# Patient Record
Sex: FEMALE | Race: WHITE | Employment: FULL TIME | URBAN - METROPOLITAN AREA
[De-identification: names, ages, dates, MRNs, and addresses within clinical notes are randomized per-mention and may not be internally consistent; named-entity substitution may affect disease eponyms.]

---

## 2017-12-16 ENCOUNTER — HOSPITAL ENCOUNTER (OUTPATIENT)
Dept: MAMMOGRAPHY | Age: 52
Discharge: HOME | End: 2017-12-16
Attending: INTERNAL MEDICINE
Payer: COMMERCIAL

## 2017-12-16 DIAGNOSIS — Z12.31 VISIT FOR SCREENING MAMMOGRAM: ICD-10-CM

## 2017-12-16 PROCEDURE — 77067 SCR MAMMO BI INCL CAD: CPT

## 2017-12-26 ENCOUNTER — HOSPITAL ENCOUNTER (OUTPATIENT)
Dept: MAMMOGRAPHY | Age: 52
Discharge: HOME | End: 2017-12-26
Attending: INTERNAL MEDICINE
Payer: COMMERCIAL

## 2017-12-26 DIAGNOSIS — R92.8 ABNORMAL SCREENING MAMMOGRAM: ICD-10-CM

## 2017-12-26 PROCEDURE — 77065 DX MAMMO INCL CAD UNI: CPT

## 2018-02-28 ENCOUNTER — APPOINTMENT (RX ONLY)
Dept: URBAN - METROPOLITAN AREA CLINIC 24 | Facility: CLINIC | Age: 53
Setting detail: DERMATOLOGY
End: 2018-02-28

## 2018-02-28 DIAGNOSIS — Z41.9 ENCOUNTER FOR PROCEDURE FOR PURPOSES OTHER THAN REMEDYING HEALTH STATE, UNSPECIFIED: ICD-10-CM

## 2018-02-28 PROCEDURE — ? GENTLELASE

## 2018-02-28 ASSESSMENT — LOCATION ZONE DERM
LOCATION ZONE: LEG
LOCATION ZONE: TRUNK

## 2018-02-28 ASSESSMENT — LOCATION SIMPLE DESCRIPTION DERM
LOCATION SIMPLE: GENITALIA
LOCATION SIMPLE: LEFT THIGH

## 2018-02-28 ASSESSMENT — LOCATION DETAILED DESCRIPTION DERM
LOCATION DETAILED: GENITALIA
LOCATION DETAILED: LEFT ANTERIOR PROXIMAL THIGH

## 2018-02-28 NOTE — PROCEDURE: GENTLELASE
Price (Use Numbers Only, No Special Characters Or $): 150.00
External Cooling: [place] Cryo 6
Cooling: DCD 50/50
Laser Type: Alexandrite 755nm
Total Pulses: 43
Fluence: 14
Indication: Laser Hair Removal
Detail Level: Detailed
Post-Care Instructions: I reviewed with the patient in detail post-care instructions. Patient should avoid sun for a minimum of 4 weeks before and after treatment.
Pulse Duration: 3 ms
Endpoint: Seborrheic Keratoses: Immediate endpoint: perilesional erythema and edema. Vaseline and ice applied. Post care reviewed with patient.
External Cooling Fan Speed: 1
Consent: Written consent obtained, risks reviewed including but not limited to crusting, scabbing, blistering, scarring, darker or lighter pigmentary change, paradoxical hair regrowth, incomplete removal of hair and infection.
Endpoint: Laser Hair Removal: Immediate endpoint: perifollicular erythema and edema. Vaseline and ice applied. Post care reviewed with patient.
Spot Size: 18 mm

## 2018-04-06 ENCOUNTER — APPOINTMENT (RX ONLY)
Dept: URBAN - METROPOLITAN AREA CLINIC 24 | Facility: CLINIC | Age: 53
Setting detail: DERMATOLOGY
End: 2018-04-06

## 2018-04-06 DIAGNOSIS — Z41.9 ENCOUNTER FOR PROCEDURE FOR PURPOSES OTHER THAN REMEDYING HEALTH STATE, UNSPECIFIED: ICD-10-CM

## 2018-04-06 PROCEDURE — ? GENTLELASE

## 2018-04-06 ASSESSMENT — LOCATION DETAILED DESCRIPTION DERM
LOCATION DETAILED: RIGHT ANTERIOR PROXIMAL THIGH
LOCATION DETAILED: LEFT ANTERIOR PROXIMAL THIGH

## 2018-04-06 ASSESSMENT — LOCATION ZONE DERM: LOCATION ZONE: LEG

## 2018-04-06 ASSESSMENT — LOCATION SIMPLE DESCRIPTION DERM
LOCATION SIMPLE: RIGHT THIGH
LOCATION SIMPLE: LEFT THIGH

## 2018-04-06 NOTE — PROCEDURE: GENTLELASE
External Cooling: [place] Cryo 6
Price (Use Numbers Only, No Special Characters Or $): 600.00
Total Pulses: 174
Consent: Written consent obtained, risks reviewed including but not limited to crusting, scabbing, blistering, scarring, darker or lighter pigmentary change, paradoxical hair regrowth, incomplete removal of hair and infection.
Endpoint: Laser Hair Removal: Immediate endpoint: perifollicular erythema and edema. Vaseline and ice applied. Post care reviewed with patient.
Endpoint: Seborrheic Keratoses: Immediate endpoint: perilesional erythema and edema. Vaseline and ice applied. Post care reviewed with patient.
Pulse Duration: 3 ms
Laser Type: Alexandrite 755nm
Fluence: 14
Spot Size: 18 mm
Cooling: DCD 50/50
External Cooling Fan Speed: 1
Detail Level: Detailed
Indication: Laser Hair Removal
Post-Care Instructions: I reviewed with the patient in detail post-care instructions. Patient should avoid sun for a minimum of 4 weeks before and after treatment.

## 2018-05-02 ENCOUNTER — APPOINTMENT (RX ONLY)
Dept: URBAN - METROPOLITAN AREA CLINIC 24 | Facility: CLINIC | Age: 53
Setting detail: DERMATOLOGY
End: 2018-05-02

## 2018-05-02 DIAGNOSIS — Z41.9 ENCOUNTER FOR PROCEDURE FOR PURPOSES OTHER THAN REMEDYING HEALTH STATE, UNSPECIFIED: ICD-10-CM

## 2018-05-02 PROCEDURE — ? GENTLELASE

## 2018-05-02 ASSESSMENT — LOCATION ZONE DERM
LOCATION ZONE: TRUNK
LOCATION ZONE: LEG

## 2018-05-02 ASSESSMENT — LOCATION DETAILED DESCRIPTION DERM
LOCATION DETAILED: LEFT SUPRAPUBIC SKIN
LOCATION DETAILED: LEFT ANTERIOR PROXIMAL THIGH
LOCATION DETAILED: GENITALIA

## 2018-05-02 ASSESSMENT — LOCATION SIMPLE DESCRIPTION DERM
LOCATION SIMPLE: GROIN
LOCATION SIMPLE: GENITALIA
LOCATION SIMPLE: LEFT THIGH

## 2018-05-02 NOTE — PROCEDURE: GENTLELASE
Spot Size: 18 mm
Consent: Written consent obtained, risks reviewed including but not limited to crusting, scabbing, blistering, scarring, darker or lighter pigmentary change, paradoxical hair regrowth, incomplete removal of hair and infection.
Total Pulses: 87
Cooling: DCD 50/50
Endpoint: Seborrheic Keratoses: Immediate endpoint: perilesional erythema and edema. Vaseline and ice applied. Post care reviewed with patient.
Laser Type: Alexandrite 755nm
Detail Level: Detailed
Post-Care Instructions: I reviewed with the patient in detail post-care instructions. Patient should avoid sun for a minimum of 4 weeks before and after treatment.
Fluence: 12
Endpoint: Laser Hair Removal: Immediate endpoint: perifollicular erythema and edema. Vaseline and ice applied. Post care reviewed with patient.
External Cooling: [place] Cryo 6
Indication: Laser Hair Removal
Pulse Duration: 3 ms
External Cooling Fan Speed: 1

## 2018-06-28 ENCOUNTER — APPOINTMENT (RX ONLY)
Dept: URBAN - METROPOLITAN AREA CLINIC 24 | Facility: CLINIC | Age: 53
Setting detail: DERMATOLOGY
End: 2018-06-28

## 2018-06-28 DIAGNOSIS — Z41.9 ENCOUNTER FOR PROCEDURE FOR PURPOSES OTHER THAN REMEDYING HEALTH STATE, UNSPECIFIED: ICD-10-CM

## 2018-06-28 PROCEDURE — ? GENTLELASE

## 2018-06-28 ASSESSMENT — LOCATION SIMPLE DESCRIPTION DERM
LOCATION SIMPLE: RIGHT THIGH
LOCATION SIMPLE: LEFT THIGH

## 2018-06-28 ASSESSMENT — LOCATION DETAILED DESCRIPTION DERM
LOCATION DETAILED: RIGHT ANTERIOR PROXIMAL THIGH
LOCATION DETAILED: LEFT ANTERIOR PROXIMAL THIGH

## 2018-06-28 ASSESSMENT — LOCATION ZONE DERM: LOCATION ZONE: LEG

## 2018-06-28 NOTE — PROCEDURE: GENTLELASE
Endpoint: Lentigines: Immediate endpoint: perilesional erythema and edema. Vaseline and ice applied. Post care reviewed with patient.
Fluence: 12
Cooling: DCD 50/50
Consent: Written consent obtained, risks reviewed including but not limited to crusting, scabbing, blistering, scarring, darker or lighter pigmentary change, paradoxical hair regrowth, incomplete removal of hair and infection.
Pulse Duration: 3 ms
External Cooling: [place] Cryo 6
External Cooling Fan Speed: 1
Post-Care Instructions: I reviewed with the patient in detail post-care instructions. Patient should avoid sun for a minimum of 4 weeks before and after treatment.
Detail Level: Detailed
Spot Size: 18 mm
Total Pulses: 26
Indication: Laser Hair Removal
Endpoint: Laser Hair Removal: Immediate endpoint: perifollicular erythema and edema. Vaseline and ice applied. Post care reviewed with patient.
Laser Type: Alexandrite 755nm

## 2018-08-08 ENCOUNTER — APPOINTMENT (RX ONLY)
Dept: URBAN - METROPOLITAN AREA CLINIC 24 | Facility: CLINIC | Age: 53
Setting detail: DERMATOLOGY
End: 2018-08-08

## 2018-08-08 DIAGNOSIS — Z41.9 ENCOUNTER FOR PROCEDURE FOR PURPOSES OTHER THAN REMEDYING HEALTH STATE, UNSPECIFIED: ICD-10-CM

## 2018-08-08 PROCEDURE — ? GENTLELASE

## 2018-08-08 ASSESSMENT — LOCATION SIMPLE DESCRIPTION DERM
LOCATION SIMPLE: GROIN
LOCATION SIMPLE: LEFT THIGH
LOCATION SIMPLE: RIGHT THIGH

## 2018-08-08 ASSESSMENT — LOCATION ZONE DERM
LOCATION ZONE: LEG
LOCATION ZONE: TRUNK

## 2018-08-08 ASSESSMENT — LOCATION DETAILED DESCRIPTION DERM
LOCATION DETAILED: RIGHT ANTERIOR PROXIMAL THIGH
LOCATION DETAILED: LEFT SUPRAPUBIC SKIN
LOCATION DETAILED: LEFT ANTERIOR PROXIMAL THIGH

## 2018-08-08 NOTE — PROCEDURE: GENTLELASE
Endpoint: Lentigines: Immediate endpoint: perilesional erythema and edema. Vaseline and ice applied. Post care reviewed with patient.
Detail Level: Detailed
External Cooling Fan Speed: 1
Cooling: DCD 50/50
Endpoint: Laser Hair Removal: Immediate endpoint: perifollicular erythema and edema. Vaseline and ice applied. Post care reviewed with patient.
Fluence: 16
Indication: Laser Hair Removal
Post-Care Instructions: I reviewed with the patient in detail post-care instructions. Patient should avoid sun for a minimum of 4 weeks before and after treatment.
Spot Size: 18 mm
Pulse Duration: 3 ms
Total Pulses: 81
Laser Type: Alexandrite 755nm
External Cooling: [place] Cryo 6
Consent: Written consent obtained, risks reviewed including but not limited to crusting, scabbing, blistering, scarring, darker or lighter pigmentary change, paradoxical hair regrowth, incomplete removal of hair and infection.

## 2018-10-02 ENCOUNTER — HOSPITAL ENCOUNTER (OUTPATIENT)
Dept: MAMMOGRAPHY | Age: 53
Discharge: HOME | End: 2018-10-02
Attending: INTERNAL MEDICINE
Payer: COMMERCIAL

## 2018-10-02 DIAGNOSIS — Z12.31 VISIT FOR SCREENING MAMMOGRAM: ICD-10-CM

## 2018-10-02 PROCEDURE — 77067 SCR MAMMO BI INCL CAD: CPT

## 2020-09-15 ENCOUNTER — HOSPITAL ENCOUNTER (OUTPATIENT)
Dept: MAMMOGRAPHY | Age: 55
Discharge: HOME | End: 2020-09-15
Attending: INTERNAL MEDICINE
Payer: COMMERCIAL

## 2020-09-15 DIAGNOSIS — Z12.31 ENCOUNTER FOR SCREENING MAMMOGRAM FOR BREAST CANCER: ICD-10-CM

## 2020-09-15 PROCEDURE — 77063 BREAST TOMOSYNTHESIS BI: CPT

## 2022-11-23 ENCOUNTER — TRANSCRIBE ORDERS (OUTPATIENT)
Dept: SCHEDULING | Age: 57
End: 2022-11-23

## 2022-11-23 DIAGNOSIS — Z12.31 SCREENING MAMMOGRAM FOR HIGH-RISK PATIENT: Primary | ICD-10-CM

## 2023-01-03 ENCOUNTER — HOSPITAL ENCOUNTER (OUTPATIENT)
Dept: MAMMOGRAPHY | Age: 58
Discharge: HOME | End: 2023-01-06
Payer: COMMERCIAL

## 2023-01-03 DIAGNOSIS — Z12.31 SCREENING MAMMOGRAM FOR HIGH-RISK PATIENT: ICD-10-CM

## 2023-01-03 PROCEDURE — 77063 BREAST TOMOSYNTHESIS BI: CPT

## 2023-10-30 DIAGNOSIS — L29.9 PRURITUS, UNSPECIFIED: Primary | ICD-10-CM

## 2023-10-30 RX ORDER — FLUOCINOLONE ACETONIDE 0.11 MG/ML
5 OIL AURICULAR (OTIC) DAILY
Qty: 1 EACH | Refills: 1 | Status: SHIPPED | OUTPATIENT
Start: 2023-10-30 | End: 2023-10-31 | Stop reason: SDUPTHER

## 2023-10-31 DIAGNOSIS — L29.9 PRURITUS, UNSPECIFIED: ICD-10-CM

## 2023-10-31 RX ORDER — FLUOCINOLONE ACETONIDE 0.11 MG/ML
5 OIL AURICULAR (OTIC) DAILY
Qty: 1 EACH | Refills: 1 | Status: SHIPPED | OUTPATIENT
Start: 2023-10-31

## 2024-01-17 ENCOUNTER — HOSPITAL ENCOUNTER (OUTPATIENT)
Dept: MAMMOGRAPHY | Age: 59
Discharge: HOME | End: 2024-01-20

## 2024-01-17 DIAGNOSIS — Z12.39 SCREENING BREAST EXAMINATION: ICD-10-CM

## 2024-01-22 ENCOUNTER — HOSPITAL ENCOUNTER (OUTPATIENT)
Dept: MAMMOGRAPHY | Age: 59
Discharge: HOME | End: 2024-01-25
Payer: COMMERCIAL

## 2024-01-22 VITALS — WEIGHT: 135 LBS | BODY MASS INDEX: 23.05 KG/M2 | HEIGHT: 64 IN

## 2024-01-22 PROCEDURE — 77063 BREAST TOMOSYNTHESIS BI: CPT

## 2024-03-14 ENCOUNTER — OFFICE VISIT (OUTPATIENT)
Dept: ENT CLINIC | Age: 59
End: 2024-03-14
Payer: COMMERCIAL

## 2024-03-14 VITALS
DIASTOLIC BLOOD PRESSURE: 68 MMHG | WEIGHT: 135.6 LBS | SYSTOLIC BLOOD PRESSURE: 118 MMHG | HEIGHT: 64 IN | BODY MASS INDEX: 23.15 KG/M2

## 2024-03-14 DIAGNOSIS — H61.23 EXCESSIVE CERUMEN IN EAR CANAL, BILATERAL: Primary | ICD-10-CM

## 2024-03-14 DIAGNOSIS — M26.609 TMJ (TEMPOROMANDIBULAR JOINT DISORDER): ICD-10-CM

## 2024-03-14 PROCEDURE — 69210 REMOVE IMPACTED EAR WAX UNI: CPT | Performed by: STUDENT IN AN ORGANIZED HEALTH CARE EDUCATION/TRAINING PROGRAM

## 2024-03-14 ASSESSMENT — ENCOUNTER SYMPTOMS
EYE ITCHING: 0
VOMITING: 0
EYE REDNESS: 0
SHORTNESS OF BREATH: 0

## 2024-03-14 NOTE — PROGRESS NOTES
removal under binocular microscopy  INDICATIONS: Cerumen impaction  DESCRIPTION: After verbal consent was obtained and a timeout was performed, the otologic microscope was used to visualize both ears. There were normal appearing auricles bilaterally. There was cerumen impaction bilaterally, right greater than left. I cleaned out both ears under the scope w/ a right angle hook and otologic suctions. After cleaning, the ear canal skin was healthy and both TMs were intact w/ no perforations. Both middle ear spaces were clear w/ no effusions. The patient tolerated the procedure well and there were no complications.    ASSESSMENT and PLAN  I will see her back in 1 year for repeat cerumen debridement.    ICD-10-CM    1. Excessive cerumen in ear canal, bilateral  H61.23 REMOVE IMPACTED EAR WAX      2. TMJ (temporomandibular joint disorder)  M26.609               Leighton Tabares MD  3/14/2024  Electronically signed    Note dictated using voice recognition software.  Please excuse any typos.

## 2024-09-14 DIAGNOSIS — L29.9 PRURITUS, UNSPECIFIED: ICD-10-CM

## 2024-09-16 RX ORDER — FLUOCINOLONE ACETONIDE 0.11 MG/ML
OIL AURICULAR (OTIC)
Qty: 20 ML | Refills: 1 | Status: SHIPPED | OUTPATIENT
Start: 2024-09-16

## 2025-03-25 ENCOUNTER — TRANSCRIBE ORDERS (OUTPATIENT)
Dept: SCHEDULING | Age: 60
End: 2025-03-25

## 2025-03-25 DIAGNOSIS — Z12.31 VISIT FOR SCREENING MAMMOGRAM: Primary | ICD-10-CM

## 2025-04-02 ENCOUNTER — OFFICE VISIT (OUTPATIENT)
Dept: ENT CLINIC | Age: 60
End: 2025-04-02

## 2025-04-02 VITALS — WEIGHT: 135 LBS | HEIGHT: 64 IN | BODY MASS INDEX: 23.05 KG/M2 | RESPIRATION RATE: 16 BRPM

## 2025-04-02 DIAGNOSIS — H93.8X3 EAR PRESSURE, BILATERAL: Primary | ICD-10-CM

## 2025-04-02 DIAGNOSIS — H61.21 EXCESSIVE EAR WAX, RIGHT: ICD-10-CM

## 2025-04-02 RX ORDER — FLUOCINOLONE ACETONIDE 0.11 MG/ML
OIL AURICULAR (OTIC)
Qty: 20 ML | Refills: 5 | Status: SHIPPED | OUTPATIENT
Start: 2025-04-02 | End: 2025-04-02 | Stop reason: SDUPTHER

## 2025-04-02 RX ORDER — MUPIROCIN 20 MG/G
OINTMENT TOPICAL
COMMUNITY
Start: 2025-02-07

## 2025-04-02 RX ORDER — OSELTAMIVIR PHOSPHATE 75 MG/1
75 CAPSULE ORAL 2 TIMES DAILY
COMMUNITY
Start: 2025-02-03

## 2025-04-02 RX ORDER — ESTRADIOL 0.1 MG/G
CREAM VAGINAL
COMMUNITY
Start: 2024-04-25

## 2025-04-02 RX ORDER — FLUOCINOLONE ACETONIDE 0.11 MG/ML
OIL AURICULAR (OTIC)
Qty: 20 ML | Refills: 5 | Status: SHIPPED | OUTPATIENT
Start: 2025-04-02

## 2025-04-02 ASSESSMENT — ENCOUNTER SYMPTOMS
APNEA: 0
CHOKING: 0
EYE ITCHING: 0
CONSTIPATION: 0
SHORTNESS OF BREATH: 0
SINUS PAIN: 0
STRIDOR: 0
SINUS PRESSURE: 0
COUGH: 0
FACIAL SWELLING: 0
WHEEZING: 0
EYE PAIN: 0
DIARRHEA: 0
NAUSEA: 0
EYE DISCHARGE: 0

## 2025-04-02 NOTE — PROGRESS NOTES
[facility] Office Note    Patient: Natalie De Capite Beemer  MRN: [id number]  : 1965  Gender:  female  Vital Signs: Resp 16   Ht 1.626 m (5' 4\")   Wt 61.2 kg (135 lb)   BMI 23.17 kg/m²   Date: 2025    CC:   Chief Complaint   Patient presents with    Other     Ear cleaning , pt was concerned she had fluid in her ears since she is getting over sickness and thought she could hear the fluid. Pt would like a refill of the drops you prescribe for her ear wax.        HPI:  Natalie De Capite Beemer is a 59 y.o. female here for ear evaluation.  She endorses recent upper respiratory infection.  She feels like she has fluid in her ears.  She still endorses some throat congestion, throat clearing, and mucus in her throat.  She feels that she still hears well overall.    Past Medical History, Past Surgical History, Family history, Social History, and Medications were all reviewed with the patient today and updated as necessary.     Allergies   Allergen Reactions    Sulfa Antibiotics Hives and Swelling     Patient Active Problem List   Diagnosis    Hypothyroidism     Current Outpatient Medications   Medication Sig    estradiol (ESTRACE) 0.1 MG/GM vaginal cream Insert 1 gm per vagina nightly for 2 weeks, then twice a week    mupirocin (BACTROBAN) 2 % ointment APPLY OINTMENT TOPICALLY TO AFFECTED AREA TWICE DAILY    oseltamivir (TAMIFLU) 75 MG capsule Take 1 capsule by mouth 2 times daily    DERMOTIC 0.01 % OIL oil Place 5 drops in ear(s) daily.    vitamin D (CHOLECALCIFEROL) 25 MCG (1000 UT) TABS tablet Take by mouth daily    calcium carbonate (OS-MEGHANA) 1250 (500 Ca) MG chewable tablet Take 1 tablet by mouth daily    levothyroxine (SYNTHROID) 75 MCG tablet Take by mouth every morning (before breakfast)    fluocinolone (DERMOTIC) 0.01 % OIL oil Apply 3 drops once daily as needed for ear discomfort.     No current facility-administered medications for this visit.     Past Medical History:   Diagnosis Date

## 2025-04-23 ENCOUNTER — HOSPITAL ENCOUNTER (OUTPATIENT)
Dept: MAMMOGRAPHY | Age: 60
Discharge: HOME | End: 2025-04-26
Payer: COMMERCIAL

## 2025-04-23 DIAGNOSIS — Z12.31 VISIT FOR SCREENING MAMMOGRAM: ICD-10-CM

## 2025-04-23 PROCEDURE — 77067 SCR MAMMO BI INCL CAD: CPT

## 2025-05-19 ENCOUNTER — HOSPITAL ENCOUNTER (OUTPATIENT)
Dept: MAMMOGRAPHY | Age: 60
Discharge: HOME | End: 2025-05-22
Payer: COMMERCIAL

## 2025-05-19 DIAGNOSIS — R92.8 ABNORMAL MAMMOGRAPHY: ICD-10-CM

## 2025-05-19 PROCEDURE — 77065 DX MAMMO INCL CAD UNI: CPT
